# Patient Record
(demographics unavailable — no encounter records)

---

## 2024-12-30 NOTE — HISTORY OF PRESENT ILLNESS
[de-identified] : BERNARD GOLDBERG is a 92-year-old RHD M patient that presents today with right shoulder pain. He states his right shoulder started hurting him on Saturday. when he awoke and couldn't raise his arm in abduction or full forward flexion. Since then he has no abduction or flexion of the right shoulder and he had full AROM prior with no recent injury.

## 2024-12-30 NOTE — PHYSICAL EXAM
[de-identified] : Right shoulder full PROM positve neer and swift AROM prior to injection FF 20 abduction 0 ER to neutral  After suba acromial injection he could abduct and forward flex to 130 degrees.  SS before injection 3/5 IS 4/5 AFter injection both 4+/5  [de-identified] : 4 views of right shoulder show no head elevation no AO normal anatomy especially for 92 years old.

## 2024-12-30 NOTE — DISCUSSION/SUMMARY
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  Differential DX is sudden exacerbation of a chronic previously well compensated RC tear or sudden attritional IS tear superimposed on a chronic SS tear vs Suprascapular nerve idiopathic palsy.  Plan:  Injection test decreased pain and brought back some active motion.  Will place in PT for RC and scapular stabilization because at this age we would like to avoid surgery. MRI of shoulder to rule out 2 tendon tear and see if atrophied.   He will follow-up with the MRI at 6 weeks after starting PT.  Thank you note with this note to Dr Oliver Prather Chief of Podiatry at [facility].

## 2024-12-30 NOTE — PROCEDURE
[de-identified] : The right shoulder was prepped with alcohol and ethyl chloride was used to numb the skin. A 6 cc injection with 3 cc xylocaine, 2 cc sensoricaine and 1 cc kenalog was given without complication into the Sub acromial space. Patient instructed that there may be an inflammatory flare for 24 hrs , to use ice or advil if needed

## 2025-02-10 NOTE — HISTORY OF PRESENT ILLNESS
[de-identified] : Paolo returns. he had major relief after the cortisone injection and began PT and could raise his arm up fully to 160 degrees but in the last weeks some weakness has returned.  He did start the PT but never got the MRI probably due to having a pacemaker.

## 2025-02-10 NOTE — PHYSICAL EXAM
[de-identified] : AROM FF 90 Abduction 85 ER 80 degrees IR L3 positive Mason and Neer.   [de-identified] : 4 views taken last visit show no major OA or joint narrowing however good true AP not performed.

## 2025-02-10 NOTE — DISCUSSION/SUMMARY
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  Plan:  He will continue PT Ultrasound to evaluate the rotator cuff.  F/U 3 weeks